# Patient Record
(demographics unavailable — no encounter records)

---

## 2025-03-26 NOTE — HISTORY OF PRESENT ILLNESS
[FreeTextEntry1] : pt is here for annual physical. [de-identified] : Patient is a 55-year-old female with PMHx hypothyroidism, Bipolar Depression, history of tobacco use disorder (quit 5 years ago, 10 pack year history), preDM, insomnia, HLD who presents for annual checkup and with multiple medical concerns as below.  * Acid reflux: worsening over the last 2-3 months. Patient has been trying to use Tums and Pepto-Bismol without much relief. Patient believes food triggers are coffee, chocolate.  Pt believes her reflux had improved after last visit 1 year prior and has now returned.   * Recently had a fever (Tmax 103F) last week that has now resolved. Denies any other symptoms such as cough, runny nose, chest pain. Believes that fever may be related to a UTI. Reports increased urinary frequency. Denies dysuria. Has been drinking more water, has not taken any medications OTC. Has been sexually active. Last STI testing in November that was negative.   * Pain in left hip (chronic) that improves after stretches.   * Insomnia - pt cannot sleep. Pt reports she has been feeling fatigued throughout the day. Additionally, STOP-BANG score 3  24 hour Diet: breakfast - coffee, English muffin with butter, boiled egg. Lunch - Instant Ramen, canned tuna Dinner - Spaghetti with sausage Snacks - may have snacks throughout the day if stressed, otherwise cookies after dinner    Exercise: has not been exercising as much as prior.

## 2025-03-26 NOTE — HEALTH RISK ASSESSMENT
[Good] : ~his/her~  mood as  good [Yes] : Yes [1 or 2 (0 pts)] : 1 or 2 (0 points) [1] : 1) Little interest or pleasure doing things for several days (1) [2] : 2) Feeling down, depressed, or hopeless for more than half of the days (2) [PHQ-2 Positive] : PHQ-2 Positive [Several Days (1)] : 1.) Little interest or pleasure in doing things? Several days [Nearly Every Day (3)] : 4.) Feeling tired or having little energy? Nearly every day [1/2 of Days or More (2)] : 7.) Trouble concentrating on things, such as reading a newspaper or watching television? Half the days or more [Not at All (0)] : 9.) Thoughts that you would be off dead or of hurting yourself in some way? Not at all [Moderate] : Severity of Depression is Moderate [PHQ-9 Positive] : PHQ-9 Positive [Former] : Former [NO] : No [Financial] : financial [Access to Medications] : access to medications [Food] : food [Housing] : housing [With Significant Other] : lives with significant other [Unemployed] : unemployed [Significant Other] : lives with significant other [Sexually Active] : sexually active [Feels Safe at Home] : Feels safe at home [Fully functional (bathing, dressing, toileting, transferring, walking, feeding)] : Fully functional (bathing, dressing, toileting, transferring, walking, feeding) [Fully functional (using the telephone, shopping, preparing meals, housekeeping, doing laundry, using] : Fully functional and needs no help or supervision to perform IADLs (using the telephone, shopping, preparing meals, housekeeping, doing laundry, using transportation, managing medications and managing finances) [Smoke Detector] : smoke detector [Carbon Monoxide Detector] : carbon monoxide detector [[id number]] : 3 [[id number]] : 13 [High Risk Behavior] : no high risk behavior [Reports changes in vision] : Reports no changes in vision [Reports normal functional visual acuity (ie: able to read med bottle)] : Reports poor functional visual acuity.  [Reports changes in dental health] : Reports no changes in dental health [de-identified] : interviewing for employment

## 2025-03-26 NOTE — END OF VISIT
[] : Resident [FreeTextEntry3] : Patient is here for annual checkup also complains of insomnia, exacerbation of GERD, possible UTI.  Patient has a history of depression and is followed by her psychiatrist.  Patient also is known to be a heavy snorer.  Will refer for sleep study.  Patient has a long history of GERD and had previous endoscopy which was unrevealing according to the patient.  Patient's symptoms are exacerbated by certain foods and lying down but not with exertion.  Will prescribe pantoprazole and have patient return in a month to see if she is improved.  Patient also has urinary frequency and urgency but no dysuria.  She think she may have a UTI.  Will obtain a UA with reflex to culture.  Will review cancer screening and vaccinations at the next visit in 1 month.  Patient is referred back to her psychiatrist for further management of her depression.

## 2025-03-26 NOTE — ASSESSMENT
[FreeTextEntry1] : Patient is a 55-year-old female with PMHx hypothyroidism, Bipolar Depression, history of tobacco use disorder (quit 5 years ago, 10 pack year history), preDM, insomnia, HLD who presents for annual checkup and with multiple medical concerns.  -Sleep study referral to r/o GALINA - trial pantoprazole 40mg daily x 1 month - UA + reflex culture r/o UTI --> abx if positive - SW referral given financial difficulties - RTO in 1 month for f/u on GERD symptoms and response to PPI

## 2025-03-26 NOTE — HEALTH RISK ASSESSMENT
[Good] : ~his/her~  mood as  good [Yes] : Yes [1 or 2 (0 pts)] : 1 or 2 (0 points) [1] : 1) Little interest or pleasure doing things for several days (1) [2] : 2) Feeling down, depressed, or hopeless for more than half of the days (2) [PHQ-2 Positive] : PHQ-2 Positive [Several Days (1)] : 1.) Little interest or pleasure in doing things? Several days [Nearly Every Day (3)] : 4.) Feeling tired or having little energy? Nearly every day [1/2 of Days or More (2)] : 7.) Trouble concentrating on things, such as reading a newspaper or watching television? Half the days or more [Not at All (0)] : 9.) Thoughts that you would be off dead or of hurting yourself in some way? Not at all [Moderate] : Severity of Depression is Moderate [PHQ-9 Positive] : PHQ-9 Positive [Former] : Former [NO] : No [Financial] : financial [Access to Medications] : access to medications [Food] : food [Housing] : housing [With Significant Other] : lives with significant other [Unemployed] : unemployed [Significant Other] : lives with significant other [Sexually Active] : sexually active [Feels Safe at Home] : Feels safe at home [Fully functional (bathing, dressing, toileting, transferring, walking, feeding)] : Fully functional (bathing, dressing, toileting, transferring, walking, feeding) [Fully functional (using the telephone, shopping, preparing meals, housekeeping, doing laundry, using] : Fully functional and needs no help or supervision to perform IADLs (using the telephone, shopping, preparing meals, housekeeping, doing laundry, using transportation, managing medications and managing finances) [Smoke Detector] : smoke detector [Carbon Monoxide Detector] : carbon monoxide detector [[id number]] : 3 [[id number]] : 13 [High Risk Behavior] : no high risk behavior [Reports changes in vision] : Reports no changes in vision [Reports normal functional visual acuity (ie: able to read med bottle)] : Reports poor functional visual acuity.  [Reports changes in dental health] : Reports no changes in dental health [de-identified] : interviewing for employment

## 2025-03-26 NOTE — HISTORY OF PRESENT ILLNESS
What Is The Reason For Today's Visit?: Full Body Skin Examination What Is The Reason For Today's Visit? (Being Monitored For X): concerning skin lesions on an annual basis [FreeTextEntry1] : pt is here for annual physical. [de-identified] : Patient is a 55-year-old female with PMHx hypothyroidism, Bipolar Depression, history of tobacco use disorder (quit 5 years ago, 10 pack year history), preDM, insomnia, HLD who presents for annual checkup and with multiple medical concerns as below.  * Acid reflux: worsening over the last 2-3 months. Patient has been trying to use Tums and Pepto-Bismol without much relief. Patient believes food triggers are coffee, chocolate.  Pt believes her reflux had improved after last visit 1 year prior and has now returned.   * Recently had a fever (Tmax 103F) last week that has now resolved. Denies any other symptoms such as cough, runny nose, chest pain. Believes that fever may be related to a UTI. Reports increased urinary frequency. Denies dysuria. Has been drinking more water, has not taken any medications OTC. Has been sexually active. Last STI testing in November that was negative.   * Pain in left hip (chronic) that improves after stretches.   * Insomnia - pt cannot sleep. Pt reports she has been feeling fatigued throughout the day. Additionally, STOP-BANG score 3  24 hour Diet: breakfast - coffee, English muffin with butter, boiled egg. Lunch - Instant Ramen, canned tuna Dinner - Spaghetti with sausage Snacks - may have snacks throughout the day if stressed, otherwise cookies after dinner    Exercise: has not been exercising as much as prior.

## 2025-03-26 NOTE — HISTORY OF PRESENT ILLNESS
[FreeTextEntry1] : pt is here for annual physical. [de-identified] : Patient is a 55-year-old female with PMHx hypothyroidism, Bipolar Depression, history of tobacco use disorder (quit 5 years ago, 10 pack year history), preDM, insomnia, HLD who presents for annual checkup and with multiple medical concerns as below.  * Acid reflux: worsening over the last 2-3 months. Patient has been trying to use Tums and Pepto-Bismol without much relief. Patient believes food triggers are coffee, chocolate.  Pt believes her reflux had improved after last visit 1 year prior and has now returned.   * Recently had a fever (Tmax 103F) last week that has now resolved. Denies any other symptoms such as cough, runny nose, chest pain. Believes that fever may be related to a UTI. Reports increased urinary frequency. Denies dysuria. Has been drinking more water, has not taken any medications OTC. Has been sexually active. Last STI testing in November that was negative.   * Pain in left hip (chronic) that improves after stretches.   * Insomnia - pt cannot sleep. Pt reports she has been feeling fatigued throughout the day. Additionally, STOP-BANG score 3  24 hour Diet: breakfast - coffee, English muffin with butter, boiled egg. Lunch - Instant Ramen, canned tuna Dinner - Spaghetti with sausage Snacks - may have snacks throughout the day if stressed, otherwise cookies after dinner    Exercise: has not been exercising as much as prior.

## 2025-03-26 NOTE — HEALTH RISK ASSESSMENT
[Good] : ~his/her~  mood as  good [Yes] : Yes [1 or 2 (0 pts)] : 1 or 2 (0 points) [1] : 1) Little interest or pleasure doing things for several days (1) [2] : 2) Feeling down, depressed, or hopeless for more than half of the days (2) [PHQ-2 Positive] : PHQ-2 Positive [Several Days (1)] : 1.) Little interest or pleasure in doing things? Several days [Nearly Every Day (3)] : 4.) Feeling tired or having little energy? Nearly every day [1/2 of Days or More (2)] : 7.) Trouble concentrating on things, such as reading a newspaper or watching television? Half the days or more [Not at All (0)] : 9.) Thoughts that you would be off dead or of hurting yourself in some way? Not at all [Moderate] : Severity of Depression is Moderate [PHQ-9 Positive] : PHQ-9 Positive [Former] : Former [NO] : No [Financial] : financial [Access to Medications] : access to medications [Food] : food [Housing] : housing [With Significant Other] : lives with significant other [Unemployed] : unemployed [Significant Other] : lives with significant other [Sexually Active] : sexually active [Feels Safe at Home] : Feels safe at home [Fully functional (bathing, dressing, toileting, transferring, walking, feeding)] : Fully functional (bathing, dressing, toileting, transferring, walking, feeding) [Fully functional (using the telephone, shopping, preparing meals, housekeeping, doing laundry, using] : Fully functional and needs no help or supervision to perform IADLs (using the telephone, shopping, preparing meals, housekeeping, doing laundry, using transportation, managing medications and managing finances) [Smoke Detector] : smoke detector [Carbon Monoxide Detector] : carbon monoxide detector [[id number]] : 3 [[id number]] : 13 [High Risk Behavior] : no high risk behavior [Reports changes in vision] : Reports no changes in vision [Reports normal functional visual acuity (ie: able to read med bottle)] : Reports poor functional visual acuity.  [Reports changes in dental health] : Reports no changes in dental health [de-identified] : interviewing for employment

## 2025-04-01 NOTE — HISTORY OF PRESENT ILLNESS
[FreeTextEntry1] : pt here for follow up [de-identified] : Patient is a 55-year-old female with PMHx hypothyroidism, Bipolar Depression, history of tobacco use disorder (quit 5 years ago, 10 pack year history), DM, insomnia, HLD who presents for follow up of DM management and diet.  Patient recently seen for annual visit and found to have A1c to 6.5%. Patient at this appointment to discuss dietary and lifestyle modifications. Patient reports feeling like she occasionally has binge-eating behavior. She has been working with her therapist/psychiatrist regarding binge eating. Patient also believes that her sleep medication may be related to sleep eating.   In terms of lifestyle, patient has been trying to walk at least 10,000 steps a day at home. Patient has been trying to do some home exercise videos from [employer].   Patient additionally requesting a letter stating that she has sleep disorders and depression that is being treated as she is concerned about being placed at a job that requires overnight hours. She is currently searching for a job and worries about being placed in a job that would negatively impact her health given her sleep disorder and depression.

## 2025-04-01 NOTE — HISTORY OF PRESENT ILLNESS
[FreeTextEntry1] : pt here for follow up [de-identified] : Patient is a 55-year-old female with PMHx hypothyroidism, Bipolar Depression, history of tobacco use disorder (quit 5 years ago, 10 pack year history), DM, insomnia, HLD who presents for follow up of DM management and diet.  Patient recently seen for annual visit and found to have A1c to 6.5%. Patient at this appointment to discuss dietary and lifestyle modifications. Patient reports feeling like she occasionally has binge-eating behavior. She has been working with her therapist/psychiatrist regarding binge eating. Patient also believes that her sleep medication may be related to sleep eating.   In terms of lifestyle, patient has been trying to walk at least 10,000 steps a day at home. Patient has been trying to do some home exercise videos from [employer].   Patient additionally requesting a letter stating that she has sleep disorders and depression that is being treated as she is concerned about being placed at a job that requires overnight hours. She is currently searching for a job and worries about being placed in a job that would negatively impact her health given her sleep disorder and depression.

## 2025-04-01 NOTE — HEALTH RISK ASSESSMENT
No [Little interest or pleasure doing things] : 1) Little interest or pleasure doing things [Feeling down, depressed, or hopeless] : 2) Feeling down, depressed, or hopeless [0] : 2) Feeling down, depressed, or hopeless: Not at all (0) [PHQ-9 Negative - No further assessment needed] : PHQ-9 Negative - No further assessment needed [Current] : Current [20 or more] : 20 or more [[id number]] : 0

## 2025-04-01 NOTE — PHYSICAL EXAM
[No Acute Distress] : no acute distress [Normal Sclera/Conjunctiva] : normal sclera/conjunctiva [PERRL] : pupils equal round and reactive to light [Normal Outer Ear/Nose] : the outer ears and nose were normal in appearance [Normal Oropharynx] : the oropharynx was normal [Normal TMs] : both tympanic membranes were normal [No Accessory Muscle Use] : no accessory muscle use [Clear to Auscultation] : lungs were clear to auscultation bilaterally [Normal Rate] : normal rate  [Regular Rhythm] : with a regular rhythm [Normal S1, S2] : normal S1 and S2 [No Edema] : there was no peripheral edema [Soft] : abdomen soft [Non Tender] : non-tender [Non-distended] : non-distended

## 2025-04-01 NOTE — HEALTH RISK ASSESSMENT
[Little interest or pleasure doing things] : 1) Little interest or pleasure doing things [Feeling down, depressed, or hopeless] : 2) Feeling down, depressed, or hopeless [0] : 2) Feeling down, depressed, or hopeless: Not at all (0) [PHQ-9 Negative - No further assessment needed] : PHQ-9 Negative - No further assessment needed [Current] : Current [20 or more] : 20 or more [[id number]] : 0

## 2025-04-29 NOTE — HISTORY OF PRESENT ILLNESS
[Never] : never [TextBox_4] : 56-year-old presenting for evaluation of insomnia and possible GALINA. States that she does wake up frequently at night and has been told that she snores. No family hx of GALINA that she knows of. She feels that she will go to bed and then will wake up multiple times with difficulty returning to sleep. She feels tired during the daytime and feels that she can nod off during the day. Denies headaches with some mental fogginess. Does admit to anxiety and depression that has driven a lot of her insomnia feelings. She also feels that over the last few months she has noticed a shortness of breath. She was diagnosed with asthma after a viral illness and states that this feels similar. When she exerts herself she feels a tightness and inability to get air in. Unsure of possible triggers but does notice the feeling with dust.

## 2025-04-29 NOTE — ASSESSMENT
[FreeTextEntry1] : 56 year old presenting for evaluation of possible GALINA and asthma  Data reviewed: PFT 03/08/2019: FVC (109%), FEV1 (95%), FEV1/FVC (69), TLC (117%), RV (118%), DLCO (61%)- Mild fixed obstruction with normal lung volumes and mild reduction in diffusion capacity.    Snoring Daytime sleepiness Obesity Insomnia Asthma   Patient does have clinical signs and symptoms compatible with GALINA. Discussed medical risk factors associated with moderate to severe GALINA including HTN, CAD, stroke among others. Discussed treatment options including PAP therapy, oral appliance therapy, and inspire. Plan to obtain sleep study. Will plan to trial her on Symbicort 160 BID for the next 6 weeks given reactive airway/asthma type symptoms. Also discussed sleep hygiene modification but will need to rule out GALINA given high clinical suspicion.  - Sleep study ordered - Symbicort 160 BID   RTC after sleep study

## 2025-05-01 NOTE — HISTORY OF PRESENT ILLNESS
[Home] : at home, [unfilled] , at the time of the visit. [Medical Office: ([facility])___] : at the medical office located in  [Telephone (audio)] : This telephonic visit was provided via audio only technology. [Technical] : patient unable to effectively utilize tele-video due to technical issues. [Verbal consent obtained from patient] : the patient, [unfilled] [FreeTextEntry1] : med refills [de-identified] : 60yoF with a PMHx of Hypothyroidism, Bipolar Disorder, Former Smoker, DM (Aic 6.5% 03/25), Insomnia, HLD, Asthma, ?GALINA presents for f/u regarding GERD symptoms. Pt reports she was started on pantoprazole at last visit and has been feeling much better since starting this medication about 1 month ago, nearly all symptoms have resolved. Triggers of GERD noted to be coffee and chocolate, has tried to reduce these. Reports no recent episodes of binge eating, continuing to see psychiatrist for mood disorders. Saw pulm few days ago and is going to have sleep study to investigate for GALINA. Was restarted on inhalers due to childhood history of asthma.

## 2025-05-01 NOTE — HISTORY OF PRESENT ILLNESS
[Home] : at home, [unfilled] , at the time of the visit. [Medical Office: ([facility])___] : at the medical office located in  [Telephone (audio)] : This telephonic visit was provided via audio only technology. [Technical] : patient unable to effectively utilize tele-video due to technical issues. [Verbal consent obtained from patient] : the patient, [unfilled] [FreeTextEntry1] : med refills [de-identified] : 60yoF with a PMHx of Hypothyroidism, Bipolar Disorder, Former Smoker, DM (Aic 6.5% 03/25), Insomnia, HLD, Asthma, ?GALINA presents for f/u regarding GERD symptoms. Pt reports she was started on pantoprazole at last visit and has been feeling much better since starting this medication about 1 month ago, nearly all symptoms have resolved. Triggers of GERD noted to be coffee and chocolate, has tried to reduce these. Reports no recent episodes of binge eating, continuing to see psychiatrist for mood disorders. Saw pulm few days ago and is going to have sleep study to investigate for GALINA. Was restarted on inhalers due to childhood history of asthma.

## 2025-05-01 NOTE — END OF VISIT
[] : Resident [FreeTextEntry3] : 56F w/hypothyroidism, Bipolar Depression, DM, insomnia, HLD for TTM for f/u. GERD - refill PPI for another 30 days, then slowly can taper off. IF symptoms continue, refer to GI. DM - refill metformin Bipolar - f/u psych  Asthma - c/w inhaler, f/u pulm   RTC [facility] for pap

## 2025-06-02 NOTE — HISTORY OF PRESENT ILLNESS
[FreeTextEntry1] : Pap smear test [de-identified] : 60yoF with a PMHx of Hypothyroidism, Bipolar Disorder, Former Smoker, DM (Aic 6.5% 03/25), Insomnia, HLD, Asthma, ?GALINA, here for PAP smear.  Today, has no acute complaints, feels in generally good health at this time.

## 2025-06-02 NOTE — REASON FOR VISIT
[Home] : at home, [unfilled] , at the time of the visit. [Medical Office: ([facility])___] : at the medical office located in  [Telephone (audio)] : This telephonic visit was provided via audio only technology. [No access to tele-video equipment] : patient lacks access to tele-video equipment. [Verbal consent obtained from patient] : the patient, [unfilled] [Follow-Up] : a follow-up visit [Sleep Apnea] : sleep apnea [Sleep Evaluation] : sleep evaluation

## 2025-06-02 NOTE — PHYSICAL EXAM
Very well control [Normal] : normal rate, regular rhythm, normal S1 and S2 and no murmur heard [Urinary Bladder Findings] : the bladder was normal on palpation

## 2025-06-02 NOTE — HEALTH RISK ASSESSMENT
[Little interest or pleasure doing things] : 1) Little interest or pleasure doing things [Feeling down, depressed, or hopeless] : 2) Feeling down, depressed, or hopeless [3] : 2) Feeling down, depressed, or hopeless for nearly every day (3) [PHQ-9 Positive] : PHQ-9 Positive [Never] : Never [de-identified] : PT. STATED SHE ON MEDICATION FRO DEPRESSION

## 2025-06-02 NOTE — ASSESSMENT
[FreeTextEntry1] : #Cervical CA Screening Reviewed outside records, 05/2024 PAP/HPV nl. - PAP w/ HPV performed today w/ chaperone  RTC in 6mo or as needed.

## 2025-06-02 NOTE — ASSESSMENT
[FreeTextEntry1] : 56 year old presenting for evaluation of possible GALINA and asthma  Data reviewed: PFT 03/08/2019: FVC (109%), FEV1 (95%), FEV1/FVC (69), TLC (117%), RV (118%), DLCO (61%)- Mild fixed obstruction with normal lung volumes and mild reduction in diffusion capacity. Sleep study 05/2025- [facility] 11-17- mild to moderate GALINA    Snoring Daytime sleepiness Obesity Insomnia Asthma   Patient does have clinical signs and symptoms compatible with GALINA with sleep study showing mild to moderate GALINA. Discussed medical risk factors associated with moderate to severe GALINA including HTN, CAD, stroke among others. Discussed treatment options including PAP therapy, oral appliance therapy, and inspire. plan to proceed with oral appliance therapy. Will give her letter of medical necessity, list of dentists, and copy of sleep study. Advised to return with device and will need to repeat sleep study. Will plan to trial her on Symbicort 160 BID for the next 6 weeks given reactive airway/asthma type symptoms. Also discussed sleep hygiene modification but will need to rule out GALINA given high clinical suspicion.  - Sleep study mild to moderate GALINA will pursue oral appliance - Symbicort 160 BID   RTC once has device for repeat sleep study

## 2025-06-02 NOTE — HISTORY OF PRESENT ILLNESS
[Never] : never [TextBox_4] : 56-year-old presenting for evaluation of insomnia and possible GALINA. States that she does wake up frequently at night and has been told that she snores. No family hx of GALINA that she knows of. She feels that she will go to bed and then will wake up multiple times with difficulty returning to sleep. She feels tired during the daytime and feels that she can nod off during the day. Denies headaches with some mental fogginess. Does admit to anxiety and depression that has driven a lot of her insomnia feelings. She also feels that over the last few months she has noticed a shortness of breath. She was diagnosed with asthma after a viral illness and states that this feels similar. When she exerts herself she feels a tightness and inability to get air in. Unsure of possible triggers but does notice the feeling with dust.   05/30/25 Patient presenting for follow up to discuss sleep study results. Still admits to feeling tired during the daytime with no new changes. [ESS] : 11